# Patient Record
Sex: FEMALE
[De-identification: names, ages, dates, MRNs, and addresses within clinical notes are randomized per-mention and may not be internally consistent; named-entity substitution may affect disease eponyms.]

---

## 2018-06-11 ENCOUNTER — HOSPITAL ENCOUNTER (EMERGENCY)
Dept: HOSPITAL 25 - UCEAST | Age: 23
Discharge: HOME | End: 2018-06-11
Payer: COMMERCIAL

## 2018-06-11 VITALS — SYSTOLIC BLOOD PRESSURE: 108 MMHG | DIASTOLIC BLOOD PRESSURE: 69 MMHG

## 2018-06-11 DIAGNOSIS — Z81.8: ICD-10-CM

## 2018-06-11 DIAGNOSIS — F90.9: Primary | ICD-10-CM

## 2018-06-11 PROCEDURE — G0463 HOSPITAL OUTPT CLINIC VISIT: HCPCS

## 2018-06-11 PROCEDURE — 99201: CPT

## 2018-06-11 NOTE — UC
UC General HPI





- HPI Summary


HPI Summary: 





Patient is here wishing for adderall to help her study for the [employer]----patient 

has taken medication before in short bursts for tests with significant 

improvement in ability t concentrate. 





- History of Current Complaint


Hx Obtained From: Patient


Hx Last Menstrual Period: 6/3/18


Onset/Duration: Gradual Onset


Timing: Intermittent Episodes Lasting:


Pain Intensity: 0





<Tahmina Hoskins - Last Filed: 06/11/18 17:52>





<Bia Higginbotham - Last Filed: 06/12/18 09:43>





- History of Current Complaint


Chief Complaint: UCGeneralIllness


Stated Complaint: PRIVATE


Time Seen by Provider: 06/11/18 16:57





- Allergy/Home Medications


Allergies/Adverse Reactions: 


 Allergies











Allergy/AdvReac Type Severity Reaction Status Date / Time


 


No Known Allergies Allergy   Verified 06/11/18 16:09











Home Medications: 


 Home Medications





Birth Control  06/11/18 [History]











PMH/Surg Hx/FS Hx/Imm Hx


Previously Healthy: Yes





- Surgical History


Surgical History: None





- Family History


Known Family History: Positive: Other


Family History: brother with ADHD and Bipolar disorder





- Social History


Occupation: Student


Lives: With Family


Alcohol Use: None


Substance Use Type: None


Smoking Status (MU): Never Smoked Tobacco





<Tahmina Hoskins - Last Filed: 06/11/18 17:52>





Review of Systems


Constitutional: Negative


Skin: Negative


Eyes: Negative


ENT: Negative


Respiratory: Negative


Cardiovascular: Negative


Gastrointestinal: Negative


Genitourinary: Negative


Motor: Negative


Neurovascular: Negative


Musculoskeletal: Negative


Neurological: Negative


Psychological: Other - teary and stressed from time to time during assessment---

mother with her and is supportive denies SI/HI


Is Patient Immunocompromised?: No


All Other Systems Reviewed And Are Negative: Yes





<Tahmina Hoskins - Last Filed: 06/11/18 17:52>





Physical Exam


Triage Information Reviewed: Yes


Appearance: Well-Appearing, No Pain Distress, Well-Nourished


Vital Signs: 


 Initial Vital Signs











Temp  99.6 F   06/11/18 16:10


 


Pulse  105   06/11/18 16:10


 


Resp  16   06/11/18 16:10


 


BP  108/69   06/11/18 16:10


 


Pulse Ox  100   06/11/18 16:10











Vital Signs Reviewed: Yes


Eye Exam: Normal


Eyes: Positive: Conjunctiva Clear


ENT Exam: Normal


ENT: Positive: Normal ENT inspection, Hearing grossly normal.  Negative: Nasal 

congestion, Trismus, Muffled voice, Hoarse voice


Dental Exam: Normal


Neck exam: Normal


Neck: Positive: Supple, Nontender


Respiratory Exam: Normal


Respiratory: Positive: Chest non-tender, No respiratory distress, No accessory 

muscle use


Cardiovascular Exam: Normal


Cardiovascular: Positive: RRR, Brisk Capillary Refill


Musculoskeletal Exam: Normal


Musculoskeletal: Positive: Strength Intact, ROM Intact, No Edema


Neurological Exam: Normal


Neurological: Positive: Alert, Muscle Tone Normal


Psychological Exam: Normal


Psychological: Positive: Normal Response To Family


Skin Exam: Normal





<Tahmina Hoskins - Last Filed: 06/11/18 17:52>


Vital Signs: 


 Initial Vital Signs











Temp  99.6 F   06/11/18 16:10


 


Pulse  105   06/11/18 16:10


 


Resp  16   06/11/18 16:10


 


BP  108/69   06/11/18 16:10


 


Pulse Ox  100   06/11/18 16:10














<Bia Higginbotham - Last Filed: 06/12/18 09:43>





Course/Dx





- Course


Course Of Treatment: follow with Dr. Edwards, [facility] [place] [facility] clinic or family and 

childrens service





- Differential Dx - Multi-Symptom


Provider Diagnoses: ADHD (by psychologist history and assessment)





<Tahmina Hoskins - Last Filed: 06/11/18 17:52>





Discharge





- Sign-Out/Discharge


Documenting (check all that apply): Discharge/Admit/Transfer





- Billing Disposition and Condition


Condition: STABLE


Disposition: Home





<Tahmina Hoskins - Last Filed: 06/11/18 17:52>





- Billing Disposition and Condition


Condition: STABLE


Disposition: Home





<Bia Higginbotham - Last Filed: 06/12/18 09:43>





- Discharge Plan


Condition: Stable


Disposition: HOME


Patient Education Materials:  ADHD in Adults (ED)


Referrals: 


[facility] [Outside] - As Soon As Possible


[facility] [Outside] - As Soon As Possible


Megan Aguirre MD [Primary Care Provider] - 3 Days





Attestation Statement


User Type: Provider - I was available for consult. This patient was seen by the 

WILMAN. The patient was not presented to, seen by, or examined by me. -Les





<Neftaly,Bia - Last Filed: 06/12/18 09:43>

## 2019-06-22 ENCOUNTER — HOSPITAL ENCOUNTER (EMERGENCY)
Dept: HOSPITAL 25 - UCEAST | Age: 24
Discharge: HOME | End: 2019-06-22
Payer: COMMERCIAL

## 2019-06-22 VITALS — SYSTOLIC BLOOD PRESSURE: 105 MMHG | DIASTOLIC BLOOD PRESSURE: 64 MMHG
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
## 2019-06-22 DIAGNOSIS — S00.212A: Primary | ICD-10-CM
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
## 2019-06-22 PROCEDURE — 99212 OFFICE O/P EST SF 10 MIN: CPT

## 2019-06-22 PROCEDURE — G0463 HOSPITAL OUTPT CLINIC VISIT: HCPCS

## 2019-06-22 NOTE — UC
Skin Complaint HPI





- HPI Summary


HPI Summary: 


YESTERDAY WHILE STUDYING PATIENT WAS ABSENTMINDEDLY SCRATCHING AT HER LEFT 

EYEBROW.  LOOKED IN THE MIRROR LATER IN THE EVENING AND REALIZED SHE HAD TAKEN 

A LAYER OF SKIN OFF.  THIS MORNING SHE WOKE UP AND THE AREA WAS CRUSTED OVER 

AND HAD REDNESS AROUND IT.  NO FEVER.  NO DRAINAGE FROM THE WOUND.








- History of Current Complaint


Chief Complaint: UCSkin


Time Seen by Provider: 06/22/19 21:42


Stated Complaint: SORE ON EYEBROW


Hx Obtained From: Patient


Hx Last Menstrual Period: 6/7


Onset/Duration: Sudden Onset, Lasting Days - 1 DAY, Still Present


Timing: Constant


Onset Severity: Mild


Current Severity: Mild


Pain Intensity: 3


Pain Scale Used: 0-10 Numeric


Character: Redness, Painful


Aggravating Factor(s): Touch


Alleviating Factor(s): Nothing


Associated Signs & Symptoms: Positive: Tenderness


Related History: Trauma





- Allergy/Home Medications


Allergies/Adverse Reactions: 


 Allergies











Allergy/AdvReac Type Severity Reaction Status Date / Time


 


No Known Allergies Allergy   Verified 06/22/19 20:47














PMH/Surg Hx/FS Hx/Imm Hx


Previously Healthy: Yes





- Surgical History


Surgical History: None





- Family History


Known Family History: Positive: Other


Family History: brother with ADHD and Bipolar disorder





- Social History


Alcohol Use: None


Substance Use Type: None


Smoking Status (MU): Never Smoked Tobacco





Review of Systems


All Other Systems Reviewed And Are Negative: Yes


Constitutional: Positive: Negative


Skin: Positive: Other - ABRASION


Eyes: Positive: Negative


ENT: Positive: Negative


Respiratory: Positive: Negative


Cardiovascular: Positive: Negative





Physical Exam


Triage Information Reviewed: Yes


Appearance: Well-Appearing, No Pain Distress, Well-Nourished


Vital Signs: 


 Initial Vital Signs











Temp  98.8 F   06/22/19 20:47


 


Pulse  64   06/22/19 20:47


 


Resp  18   06/22/19 20:47


 


BP  105/64   06/22/19 20:47


 


Pulse Ox  100   06/22/19 20:47











Vital Signs Reviewed: Yes


Eyes: Positive: Conjunctiva Clear


ENT: Positive: Hearing grossly normal


Neck: Positive: Supple


Respiratory: Positive: No respiratory distress, No accessory muscle use


Cardiovascular: Positive: Pulses Normal


Abdomen Description: Positive: Soft


Musculoskeletal: Positive: No Edema


Neurological: Positive: Alert


Psychological: Positive: Age Appropriate Behavior


Skin: Positive: Other - 1.5CM ABRASION MEDIAL LEFT EYEBROW WITH SLIGHT 

SURROUNDING ERYTHEMA AND OOZE.





Course/Dx





- Course


Course Of Treatment: 





CONCERN FOR ABRASION WITH DEVELOPING SECONDARY BACTERIAL INFECTION.  WILL COVER 

WITH KEFLEX AND TOPICAL BACTROBAN.  FOLLOW-UP IF SHE IS NOT IMPROVING AS 

EXPECTED.





- Diagnoses


Provider Diagnosis: 


 Abrasion








Discharge





- Sign-Out/Discharge


Documenting (check all that apply): Patient Departure


All imaging exams completed and their final reports reviewed: No Studies





- Discharge Plan


Condition: Stable


Disposition: HOME


Prescriptions: 


Cephalexin CAP* [Keflex 500 CAP*] 500 mg PO BID #19 cap


Patient Education Materials:  Impetigo (ED), Abrasion (ED)


Referrals: 


Gabrielle Cespedes MD [Primary Care Provider] - If Needed


Additional Instructions: 


YOU HAVE GIVEN YOURSELF AN ABRASION WHICH MAY HAVE DEVELOPED A SECONDARY 

BACTERIAL INFECTION.  WILL COVER WITH TOPICAL ANTIBIOTIC OINTMENT AS WELL AS 

ORAL ANTIBIOTICS FOR 10 DAYS.  KEEP THE AREA CLEAN.  DO NOT PICK AT IT.  FOLLOW-

UP WITH YOUR PCP IF IT IS NOT IMPROVING AS EXPECTED OVER THE NEXT COUPLE OF 

WEEKS.  TAKE APPROPRIATE SUN PRECAUTIONS.





- Billing Disposition and Condition


Condition: STABLE


Disposition: Home

## 2019-06-22 NOTE — XMS REPORT
Continuity of Care Document (CCD)

 Created on:2019



Patient:Melyssa Still

Sex:Female

:1995

External Reference #:[id number]





Demographics







 Address  [address]



   [place] [address]

 

 Mobile Phone  [phone]

 

 Email Address  [email or web address]

 

 Preferred Language  en

 

 Marital Status  Declined to Specify/Unknown

 

 Mu-ism Affiliation  Unknown

 

 Race  Unknown

 

 Additional Race(s)  Other Race

 

 Ethnic Group  Declined to Specify/Unknown









Author







 Name  Sheila Cyr









Support







 Name  Relationship  Address  Phone

 

 Asha Ortega  Unavailable  [address]  [phone]



     [place] [address]  









Care Team Providers







 Name  Role  Phone

 

 Megan Aguirre MD  Primary Care Physician  Unavailable









Payers







 Date  Identification Numbers  Payment Provider  Subscriber

 

 Effective: 2018  Policy Number: [id number]  Андрей Still









 PayID: [id number]  [id number]









 [place] [address]







Family History







 Date  Family Member(s)  Observation  Comments

 

   Mother  ovarian cyst that resolved  

 

   Siblings  1  1 brother







Social History







 Type  Date  Description  Comments

 

 Birth Sex    Unknown  

 

 Marital Status    Single  

 

 Lives With    Mother  

 

 Occupation    Student  

 

 ETOH Use    Denies alcohol use  

 

 Tobacco Use  Start: Unknown  Patient has never smoked  

 

 Recreational Drug Use    Denies Drug Use  

 

 Smoking Status  Reviewed: 19  Patient has never smoked  

 

 Exercise Type/Frequency    Does not exercise  







Allergies, Adverse Reactions, Alerts







 Description

 

 No Known Drug Allergies







Medications







 Active Medications  SIG  Qnty  Indications  Ordering Provider  Date

 

 Amphetamine-Dextroamp  take one tablet  30tabs  F90.0  Sheryl Ansari MD  2019



 hetamine  in in the morning        



        10mg Tablets          



           

 

 Drospirenone-Ethinyl  1 by mouth every      Unknown  



 Estradiol  day        



         3-0.02mg          



 Tablets          



           

 

 Multi Vitamin  1 by mouth every      Unknown  



              Tablets  day        



           

 

 Iron (Ferrous  1 by mouth once      Unknown  



 Gluconate)  daily when has        



          256(28Fe) mg  heavy period        



 Tablets          



           







Vital Signs







 Date  Vital  Result  Comment

 

 2019 11:30am  Height  65 inches  5'5"









 Weight  110.00 lb  

 

 Heart Rate  80 /min  

 

 BP Systolic Sitting  104 mmHg  

 

 BP Diastolic Sitting  61 mmHg  

 

 Body Temperature  97.8 F  

 

 O2 % BldC Oximetry  98 %  

 

 BMI (Body Mass Index)  18.3 kg/m2  









 2018  2:15pm  Height  65 inches  5'5"









 Weight  114.00 lb  

 

 Heart Rate  65 /min  

 

 BP Systolic Sitting  102 mmHg  

 

 BP Diastolic Sitting  58 mmHg  

 

 BMI (Body Mass Index)  19.0 kg/m2  







Results







 Test  Date  Facility  Test  Result  H/L  Range  Note

 

 Laboratory test  2018  [facility]  Testosterone Total  40.60 
ng/dL  N  8-60  



 finding    [address] [place] [address] [phone]          









 Androstenedione Level  107 ng/dL      1

 

 Progesterone 17Hydroxy  <40 ng/dL      2

 

 Prolactin  17.6 ng/mL  N  1.0-25.0  

 

 Cortisol  22.35 g/dL      3

 

 Acth  13 pg/mL      4









 1  -------------------ADDITIONAL INFORMATION-------------------



   This test was developed and its performance characteristics



   determined by [facility] in a manner consistent with CLIA



   requirements. This test has not been cleared or approved by



   the U.S. Food and Drug Administration.



   Test Performed by:



   [facility] - [address] [address]

 

 2  -------------------REFERENCE VALUE--------------------------



   < 80 (Follicular)



   <285 (Luteal)



   -------------------ADDITIONAL INFORMATION-------------------



   This test was developed and its performance characteristics



   determined by [facility] in a manner consistent with CLIA



   requirements. This test has not been cleared or approved by



   the U.S. Food and Drug Administration.



   Test Performed by:



   [facility] - [address] [address]

 

 3  AM 8.7-22.4



   PM <10

 

 4  -------------------REFERENCE VALUE--------------------------



   7.2-63 (a.m. collection)



   Test Performed by:



   [facility] - [facility]



   [address] [place] [address]







Encounters







 Type  Date  Location  Provider  Dx  Diagnosis

 

 Office Visit  2018  [facility]  Yehuda Chauhan MD  L68.0  
Hirsutism



   2:00p  [facility]      







Plan of Treatment

Future Appointment(s):2019  3:40 pm - Sheryl Ansari MD at [facility] 
Medicine - [facility]2019 - Sheryl Ansari MDF41.9 Anxiety disorder, 
unspecifiedComments:Try bio [id number] Attention-deficit hyperactivity 
disorder, predominantly inatNew Medication:Amphetamine-Dextroamphetamine 10 mg 
- take one tablet in in the morningFollow up:F/U 4 weeks

## 2019-09-29 ENCOUNTER — HOSPITAL ENCOUNTER (EMERGENCY)
Dept: HOSPITAL 25 - ED | Age: 24
Discharge: HOME | End: 2019-09-29
Payer: COMMERCIAL

## 2019-09-29 VITALS — DIASTOLIC BLOOD PRESSURE: 51 MMHG | SYSTOLIC BLOOD PRESSURE: 98 MMHG

## 2019-09-29 DIAGNOSIS — Z79.3: ICD-10-CM

## 2019-09-29 DIAGNOSIS — R11.2: Primary | ICD-10-CM

## 2019-09-29 LAB
ALBUMIN SERPL BCG-MCNC: 4.1 G/DL (ref 3.2–5.2)
ALBUMIN/GLOB SERPL: 1.6 {RATIO} (ref 1–3)
ALP SERPL-CCNC: 62 U/L (ref 34–104)
ALT SERPL W P-5'-P-CCNC: 9 U/L (ref 7–52)
ANION GAP SERPL CALC-SCNC: 5 MMOL/L (ref 2–11)
AST SERPL-CCNC: 15 U/L (ref 13–39)
BASOPHILS # BLD AUTO: 0.1 10^3/UL (ref 0–0.2)
BUN SERPL-MCNC: 15 MG/DL (ref 6–24)
BUN/CREAT SERPL: 19.7 (ref 8–20)
CALCIUM SERPL-MCNC: 9.3 MG/DL (ref 8.6–10.3)
CHLORIDE SERPL-SCNC: 109 MMOL/L (ref 101–111)
EOSINOPHIL # BLD AUTO: 0 10^3/UL (ref 0–0.6)
GLOBULIN SER CALC-MCNC: 2.5 G/DL (ref 2–4)
GLUCOSE SERPL-MCNC: 141 MG/DL (ref 70–100)
HCG SERPL QL: < 0.6 MIU/ML
HCO3 SERPL-SCNC: 23 MMOL/L (ref 22–32)
HCT VFR BLD AUTO: 37 % (ref 35–47)
HGB BLD-MCNC: 12.9 G/DL (ref 12–16)
LYMPHOCYTES # BLD AUTO: 0.5 10^3/UL (ref 1–4.8)
MCH RBC QN AUTO: 30 PG (ref 27–31)
MCHC RBC AUTO-ENTMCNC: 35 G/DL (ref 31–36)
MCV RBC AUTO: 85 FL (ref 80–97)
MONOCYTES # BLD AUTO: 0.9 10^3/UL (ref 0–0.8)
NEUTROPHILS # BLD AUTO: 12.5 10^3/UL (ref 1.5–7.7)
NRBC # BLD AUTO: 0 10^3/UL
NRBC BLD QL AUTO: 0
PLATELET # BLD AUTO: 209 10^3/UL (ref 150–450)
POTASSIUM SERPL-SCNC: 4 MMOL/L (ref 3.5–5)
PROT SERPL-MCNC: 6.6 G/DL (ref 6.4–8.9)
RBC # BLD AUTO: 4.35 10^6 /UL (ref 3.7–4.87)
RBC UR QL AUTO: (no result)
SODIUM SERPL-SCNC: 137 MMOL/L (ref 135–145)
WBC # BLD AUTO: 14.1 10^3/UL (ref 3.5–10.8)
WBC UR QL AUTO: (no result)

## 2019-09-29 PROCEDURE — 96375 TX/PRO/DX INJ NEW DRUG ADDON: CPT

## 2019-09-29 PROCEDURE — 87389 HIV-1 AG W/HIV-1&-2 AB AG IA: CPT

## 2019-09-29 PROCEDURE — 96361 HYDRATE IV INFUSION ADD-ON: CPT

## 2019-09-29 PROCEDURE — 81003 URINALYSIS AUTO W/O SCOPE: CPT

## 2019-09-29 PROCEDURE — 36415 COLL VENOUS BLD VENIPUNCTURE: CPT

## 2019-09-29 PROCEDURE — 96374 THER/PROPH/DIAG INJ IV PUSH: CPT

## 2019-09-29 PROCEDURE — 81015 MICROSCOPIC EXAM OF URINE: CPT

## 2019-09-29 PROCEDURE — 85025 COMPLETE CBC W/AUTO DIFF WBC: CPT

## 2019-09-29 PROCEDURE — 87086 URINE CULTURE/COLONY COUNT: CPT

## 2019-09-29 PROCEDURE — 80053 COMPREHEN METABOLIC PANEL: CPT

## 2019-09-29 PROCEDURE — 83690 ASSAY OF LIPASE: CPT

## 2019-09-29 PROCEDURE — 86140 C-REACTIVE PROTEIN: CPT

## 2019-09-29 PROCEDURE — 99282 EMERGENCY DEPT VISIT SF MDM: CPT

## 2019-09-29 PROCEDURE — 84702 CHORIONIC GONADOTROPIN TEST: CPT

## 2019-09-29 PROCEDURE — 87651 STREP A DNA AMP PROBE: CPT

## 2019-09-29 NOTE — ED
Abdominal Pain/Female





- HPI Summary


HPI Summary: 


Pt. is a 24 y.o female who presents to the ER for fever, body aches, N/V, and 

sore throat since yesterday. Pt. denies urinary sxs, diarrhea, rash, neck pain. 

She does not mild lower pelvic pain but states she is finishing her menstrual 

cycle. 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 Sxs are moderate in 

severity. No current modifying factors. 








- History of Current Complaint


Chief Complaint: EDFluSymptoms


Stated Complaint: THROWING UP/FEVER PER PT


Time Seen by Provider: 09/29/19 06:14


Hx Obtained From: Patient


Hx Last Menstrual Period: 6/7


Pain Intensity: 6


Allergies/Adverse Reactions: 


 Allergies











Allergy/AdvReac Type Severity Reaction Status Date / Time


 


No Known Allergies Allergy   Verified 09/29/19 06:25











Home Medications: 


 Home Medications





Acetaminophen [Tylenol Extra Strength] 1 tab PO Q8H PRN 09/29/19 [History 

Confirmed 09/29/19]


Ethinyl Estradiol/Drospirenone [Drospirenone-Ee 3-0.02 mg Tab] 1 tab PO DAILY 09 /29/19 [History Confirmed 09/29/19]


Ibuprofen TAB* [Advil TAB*] 400 mg PO Q6H PRN 09/29/19 [History Confirmed 09/29/ 19]


Multivitamins/Minerals TAB* [Theragran/minerals TAB*] 1 tab PO DAILY 09/29/19 [

History Confirmed 09/29/19]











PMH/Surg Hx/FS Hx/Imm Hx


Previously Healthy: Yes


Endocrine/Hematology History: 


   Denies: Hx Diabetes, Hx Thyroid Disease


Cardiovascular History: 


   Denies: Hx Hypertension


Respiratory History: 


   Denies: Hx Asthma, Hx Chronic Obstructive Pulmonary Disease (COPD)


GI History: 


   Denies: Hx Ulcer


Infectious Disease History: No


Infectious Disease History: 


   Denies: Hx Hepatitis, Hx Human Immunodeficiency Virus (HIV), Traveled 

Outside the [place] in Last 30 Days





- Family History


Known Family History: Positive: Other, Non-Contributory


Family History: brother with ADHD and Bipolar disorder





- Social History


Occupation: Student


Lives: With Family


Alcohol Use: None


Substance Use Type: Reports: None


Smoking Status (MU): Never Smoked Tobacco





Review of Systems


Positive: Fever, Chills


Eyes: Negative


Positive: Sore Throat


Cardiovascular: Negative


Respiratory: Negative


Positive: Abdominal Pain, Vomiting, Nausea.  Negative: Diarrhea


Genitourinary: Negative


Negative: dysuria


Positive: Myalgia


Skin: Negative


Negative: Rash


Neurological: Negative


All Other Systems Reviewed And Are Negative: Yes





Physical Exam


Triage Information Reviewed: Yes


Vital Signs On Initial Exam: 


 Initial Vitals











Temp Pulse Resp BP Pulse Ox


 


 98.3 F   88   18   108/91   96 


 


 09/29/19 06:05  09/29/19 06:05  09/29/19 06:05  09/29/19 06:05  09/29/19 06:05











Vital Signs Reviewed: Yes


Appearance: Positive: Well-Appearing - Pt. lying in bed in NAD. Anxious. Mother 

present.


Skin: Positive: Warm, Dry


Head/Face: Positive: Normal Head/Face Inspection


Eyes: Positive: Normal, EOMI, DIEGO, Conjunctiva Clear


ENT: Positive: Pharyngeal erythema, TMs normal.  Negative: Tonsillar swelling, 

Tonsillar exudate


Neck: Positive: Supple, Nontender.  Negative: Nuchal Rigidity


Respiratory/Lung Sounds: Positive: Clear to Auscultation, Breath Sounds 

Present.  Negative: Rales, Rhonchi, Wheezes


Cardiovascular: Positive: Normal, RRR


Abdomen Description: Positive: Other: - Mild tenderness to suprapubic region. 

No RLQ tenderness. No guarding.


Neurological: Positive: Normal, CN Intact II-III


Psychiatric: Positive: Affect/Mood Appropriate





Diagnostics





- Vital Signs


 Vital Signs











  Temp Pulse Resp BP Pulse Ox


 


 09/29/19 06:05  98.3 F  88  18  108/91  96














- Laboratory


Result Diagrams: 


 09/29/19 06:40





 09/29/19 06:40


Lab Statement: Any lab studies that have been ordered have been reviewed, and 

results considered in the medical decision making process.





Abdominal Pain Fem Course/Dx





- Course


Course Of Treatment: Pt. presenting with the above sxs. She is afebrile and 

well appearing in ED. Pt. has a benign abd. exam without RLQ pain. Pt. started 

on IV fluids, zofran and toradol.  CBC shows a leukocytosis of 14,000.  CRP 46.

  Labs otherwise unremarkable.  Urinalysis shows ketones and was negative for 

infection.  Patient is given Tylenol liters of fluids.  Reevaluation she is 

feeling much better and is tolerating juice and crackers.  Suspect viral 

etiology.  Prescription for Zofran given.  Advised to return to the ER for 

increased abdominal pain, uncontrolled vomiting, fever or if concerns.  Patient 

and her mother understand and agree with plan.





- Diagnoses


Differential Diagnosis: Positive: Appendicitis, Pregnancy, Urinary Tract 

Infection


Provider Diagnoses: 


 Nausea & vomiting








Discharge ED





- Sign-Out/Discharge


Documenting (check all that apply): Patient Departure


Patient Received Moderate/Deep Sedation with Procedure: No





- Discharge Plan


Condition: Improved


Disposition: HOME


Prescriptions: 


Ondansetron TAB* [Zofran 4 MG Tab*] 4 mg PO Q6H PRN #12 tab


 PRN Reason: Nausea


Patient Education Materials:  Acute Nausea and Vomiting (ED), Viral Syndrome (ED

)


Referrals: 


Sheryl Ansari MD [Primary Care Provider] - 


Additional Instructions: 


Follow up with PCP in 2-3 days if symptoms persist


Zofran as directed for nausea/vomiting


Increase fluids


Tylenol or Motrin for discomfort as directed


Return to ER for uncontrollable vomiting, increased abdominal pain or if 

concerned





- Billing Disposition and Condition


Condition: IMPROVED


Disposition: Home